# Patient Record
Sex: MALE | Race: BLACK OR AFRICAN AMERICAN | NOT HISPANIC OR LATINO | Employment: STUDENT | URBAN - METROPOLITAN AREA
[De-identification: names, ages, dates, MRNs, and addresses within clinical notes are randomized per-mention and may not be internally consistent; named-entity substitution may affect disease eponyms.]

---
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
## 2022-01-03 ENCOUNTER — TELEPHONE (OUTPATIENT)
Dept: PHARMACY | Facility: CLINIC | Age: 22
End: 2022-01-03
Payer: MEDICAID

## 2022-01-07 ENCOUNTER — TELEPHONE (OUTPATIENT)
Dept: SLEEP MEDICINE | Facility: OTHER | Age: 22
End: 2022-01-07
Payer: MEDICAID

## 2022-01-12 ENCOUNTER — TELEPHONE (OUTPATIENT)
Dept: SLEEP MEDICINE | Facility: OTHER | Age: 22
End: 2022-01-12
Payer: MEDICAID

## 2022-01-17 ENCOUNTER — TELEPHONE (OUTPATIENT)
Dept: PEDIATRIC PULMONOLOGY | Facility: CLINIC | Age: 22
End: 2022-01-17
Payer: MEDICAID

## 2022-01-18 NOTE — TELEPHONE ENCOUNTER
Called patient in regards to sleep study that was ordered. Pt states they have not scheduled but should be calling sometime this week. Provided patient with sleep study number. Pt verbalized understanding.

## 2022-01-31 ENCOUNTER — TELEPHONE (OUTPATIENT)
Dept: SLEEP MEDICINE | Facility: OTHER | Age: 22
End: 2022-01-31
Payer: MEDICAID

## 2022-01-31 NOTE — TELEPHONE ENCOUNTER
Patients parent canceled his sleep study,we talked to parent..  Left a message and sent out a message through [employer] to reschedule.  No response.

## 2022-02-07 ENCOUNTER — TELEPHONE (OUTPATIENT)
Dept: PEDIATRIC PULMONOLOGY | Facility: CLINIC | Age: 22
End: 2022-02-07
Payer: MEDICAID

## 2022-05-16 ENCOUNTER — PATIENT MESSAGE (OUTPATIENT)
Dept: PEDIATRIC PULMONOLOGY | Facility: CLINIC | Age: 22
End: 2022-05-16
Payer: MEDICAID

## 2022-05-16 DIAGNOSIS — R06.7 SNEEZING: ICD-10-CM

## 2022-05-16 DIAGNOSIS — J45.909 ASTHMA, WELL CONTROLLED, UNSPECIFIED ASTHMA SEVERITY, UNSPECIFIED WHETHER PERSISTENT: Primary | ICD-10-CM

## 2022-05-16 DIAGNOSIS — R05.9 COUGH: ICD-10-CM

## 2022-05-16 DIAGNOSIS — R09.82 POST-NASAL DRIP: ICD-10-CM

## 2022-05-16 RX ORDER — ALBUTEROL SULFATE 90 UG/1
4 AEROSOL, METERED RESPIRATORY (INHALATION) EVERY 4 HOURS PRN
Qty: 18 G | Refills: 2 | Status: SHIPPED | OUTPATIENT
Start: 2022-05-16

## 2022-05-16 RX ORDER — MONTELUKAST SODIUM 10 MG/1
10 TABLET ORAL NIGHTLY
Qty: 30 TABLET | Refills: 2 | OUTPATIENT
Start: 2022-05-16 | End: 2023-05-16

## 2022-05-16 RX ORDER — CETIRIZINE HYDROCHLORIDE 10 MG/1
10 TABLET ORAL DAILY
Qty: 30 TABLET | Refills: 2 | OUTPATIENT
Start: 2022-05-16 | End: 2023-05-16

## 2022-05-16 RX ORDER — MOMETASONE FUROATE AND FORMOTEROL FUMARATE DIHYDRATE 50; 5 UG/1; UG/1
AEROSOL RESPIRATORY (INHALATION)
Qty: 13 G | Refills: 2 | Status: SHIPPED | OUTPATIENT
Start: 2022-05-16 | End: 2022-12-22

## 2022-05-17 NOTE — TELEPHONE ENCOUNTER
I refilled the Dulera and albuterol.  He needs an appointment scheduled or transition to an adult provider.  They haven't been to see me since October and they did not do the sleep study I ordered despite multiple reminders.    Request they get Zyrtec and Singulair through PCP if still needed as they were the original prescriber.

## 2022-09-14 NOTE — PROGRESS NOTES
Discharge Planning    Returned call from Lizz Bueno, son, to discuss discharge plan. After a lengthy conversation, the son wants information sent out to [facility] but may choose  home health. He will speak with mother this evening for the final decision and will call writer in the morning. The family will transport patient to and from radiation treatments. Answered all questions and addressed concerns for discharge. [place] of choice for facilities in [facility] and placed in cc/link     will continue to assist with transition of care needs.      MARCY MarchN, RN  Pager # [phone]  Care Manager Subjective:      Jacque Naik is a 17 y.o. male here with mother. Patient brought in for sore throat,diarrhea, headache, and congestion    History of Present Illness:  HPI  He had sore throat noted 3 days , but not since then.  He had lessened voice.  No fever  He had 3-4 loose stools two days ago;  He had 2 loose stools yesterday, x 1 today  No vomiting.    He coughed up mucous today;  Dark red blood noted.  No pain except for abdominal pain.     He had headache earlier today     Review of Systems   Constitutional: Negative.  Negative for activity change.   HENT: Positive for congestion and sore throat. Negative for ear pain.    Eyes: Negative for discharge.   Respiratory: Negative for cough.    Gastrointestinal: Positive for abdominal pain and diarrhea. Negative for vomiting.   Genitourinary: Negative for dysuria.   Skin: Negative for rash.   Neurological: Positive for headaches.       Objective:     Physical Exam   Constitutional:   Obese with dark skin around the neck   HENT:   Head: Normocephalic.   Right Ear: External ear normal.   Left Ear: External ear normal.   Eyes: Right eye exhibits no discharge. Left eye exhibits no discharge.   Neck: Neck supple.   Cardiovascular: Normal rate and normal heart sounds.    No murmur heard.  Pulmonary/Chest: Effort normal. No respiratory distress. He has no wheezes. He has no rales.   Abdominal: He exhibits no distension and no mass. There is no tenderness. There is no rebound and no guarding.   Genitourinary: Penis normal.   Neurological: He is alert.   Skin: Skin is warm. He is not diaphoretic.   Psychiatric: He has a normal mood and affect.       Assessment:         Jacque was seen today for sore throat, nasal congestion and diarrhea.    Diagnoses and all orders for this visit:    Diarrhea, unspecified type    Sore throat    Acanthosis nigricans    ELVA (obstructive sleep apnea)    Obesity with body mass index (BMI) greater than 99th percentile for age in pediatric  patient, unspecified obesity type, unspecified whether serious comorbidity present              Plan:         Patient Instructions   Please drink only gatorade/powerade and eat bananas until 5 PM;  Then a regular dinner;  Baked chicken, dry potato and carrots.    If the diarrhea is not lessening, please return with a stool sample.       Please plan on your surgery for his sleep apnea.      Cool mist humidifier for 3-4 days    Elevate Head of Bed    Measure temperature 3 times daily

## 2022-10-26 ENCOUNTER — HOSPITAL ENCOUNTER (EMERGENCY)
Facility: HOSPITAL | Age: 22
Discharge: HOME | End: 2022-10-27
Attending: EMERGENCY MEDICINE
Payer: MEDICAID

## 2022-10-26 DIAGNOSIS — J06.9 VIRAL URI WITH COUGH: Primary | ICD-10-CM

## 2022-10-26 PROCEDURE — 99283 EMERGENCY DEPT VISIT LOW MDM: CPT | Mod: 25,ER

## 2022-10-26 NOTE — Clinical Note
"Jacque Minor" Gumaro was seen and treated in our emergency department on 10/26/2022.  He may return to work on 10/29/2022.       If you have any questions or concerns, please don't hesitate to call.      Tarsha RAM    "

## 2022-10-27 VITALS
HEART RATE: 101 BPM | RESPIRATION RATE: 18 BRPM | WEIGHT: 286 LBS | HEIGHT: 72 IN | DIASTOLIC BLOOD PRESSURE: 86 MMHG | OXYGEN SATURATION: 99 % | BODY MASS INDEX: 38.74 KG/M2 | TEMPERATURE: 99 F | SYSTOLIC BLOOD PRESSURE: 150 MMHG

## 2022-10-27 LAB
INFLUENZA A, MOLECULAR: NEGATIVE
INFLUENZA B, MOLECULAR: NEGATIVE
SPECIMEN SOURCE: NORMAL

## 2022-10-27 PROCEDURE — 25000003 PHARM REV CODE 250: Mod: ER | Performed by: EMERGENCY MEDICINE

## 2022-10-27 PROCEDURE — 87502 INFLUENZA DNA AMP PROBE: CPT | Mod: ER | Performed by: EMERGENCY MEDICINE

## 2022-10-27 RX ORDER — FLUTICASONE PROPIONATE 50 MCG
1 SPRAY, SUSPENSION (ML) NASAL 2 TIMES DAILY PRN
Qty: 15 G | Refills: 0 | Status: SHIPPED | OUTPATIENT
Start: 2022-10-27 | End: 2022-12-22

## 2022-10-27 RX ORDER — IBUPROFEN 400 MG/1
800 TABLET ORAL
Status: COMPLETED | OUTPATIENT
Start: 2022-10-27 | End: 2022-10-27

## 2022-10-27 RX ORDER — BENZONATATE 100 MG/1
200 CAPSULE ORAL 3 TIMES DAILY PRN
Qty: 20 CAPSULE | Refills: 0 | Status: SHIPPED | OUTPATIENT
Start: 2022-10-27 | End: 2022-11-06

## 2022-10-27 RX ADMIN — IBUPROFEN 800 MG: 400 TABLET, FILM COATED ORAL at 12:10

## 2022-11-04 NOTE — ED PROVIDER NOTES
"Encounter Date: 10/26/2022       History     Chief Complaint   Patient presents with    multiple complaints     Reports having headache, sore throat, left ear pain, hypertension, and chills. States he was seen at urgent care yesterday and tested for strep which was negative. Patient currently taking amoxicillin.     Patient currently presents with concerns regarding flulike symptoms.  Symptoms include nasal congestion, headache, sore throat, fever, and myalgias.  Symptoms reportedly began 2 days ago.  Patient denies vomiting. Patient denies diarrhea.  There has been no relief with over-the-counter medications at home.  Patient denies recent ill contacts.  Notably the patient was seen at  yesterday and tested negative for strep.      Review of patient's allergies indicates:  No Known Allergies  Past Medical History:   Diagnosis Date    Acanthosis nigricans     Asthma, not well controlled     Asthma, well controlled     Cough     Eczema     Obesity     Patient non adherence     Sleep apnea      Past Surgical History:   Procedure Laterality Date    MYRINGOTOMY W/ TUBES      TONSILLECTOMY AND ADENOIDECTOMY Bilateral 5/29/2018    Procedure: TONSILLECTOMY-ADENOIDECTOMY (T AND A);  Surgeon: Isaac Byrd MD;  Location: [facility] OR [address] [place] [address];  Service: ENT;  Laterality: Bilateral;     Family History   Problem Relation Age of Onset    Diabetes Mother     Congenital heart disease Mother 0        "innocent murmur"    Diabetes Father     Pacemaker/defibrilator Maternal Grandmother 70        bradycardia    Amblyopia Neg Hx     Blindness Neg Hx     Other Neg Hx     Arrhythmia Neg Hx     Cardiomyopathy Neg Hx     Early death Neg Hx     Heart attacks under age 50 Neg Hx      Social History     Tobacco Use    Smoking status: Never    Smokeless tobacco: Never   Substance Use Topics    Alcohol use: No    Drug use: No     Review of Systems   Constitutional:  Positive for chills and fatigue. Negative for fever.   HENT:  Positive for " congestion, ear pain and sore throat.    Respiratory:  Negative for chest tightness and shortness of breath.    Cardiovascular:  Negative for chest pain and palpitations.   Gastrointestinal:  Negative for abdominal pain and vomiting.   Genitourinary:  Negative for difficulty urinating and dysuria.   Skin:  Negative for color change and rash.   Neurological:  Positive for headaches. Negative for weakness and numbness.   Hematological:  Negative for adenopathy.   All other systems reviewed and are negative.    Physical Exam     Initial Vitals [10/26/22 2354]   BP Pulse Resp Temp SpO2   (!) 159/91 94 20 99.3 °F (37.4 °C) 100 %      MAP       --         Physical Exam    Constitutional: He appears well-developed and well-nourished. He is not diaphoretic. No distress.   HENT:   Head: Normocephalic and atraumatic.   Nose: Nose normal.   Mouth/Throat: Oropharynx is clear and moist.   Eyes: Conjunctivae are normal. No scleral icterus.   Neck: Neck supple. No JVD present.   Cardiovascular:  Normal rate, regular rhythm, normal heart sounds and intact distal pulses.           Pulmonary/Chest: Breath sounds normal. No respiratory distress.   Abdominal: Abdomen is soft. He exhibits no distension. There is no abdominal tenderness.   Musculoskeletal:      Cervical back: Neck supple.     Neurological: He is alert and oriented to person, place, and time.   Skin: Skin is warm and dry.       ED Course   Procedures  Labs Reviewed   INFLUENZA A & B BY MOLECULAR          Imaging Results    None          Medications   ibuprofen tablet 800 mg (800 mg Oral Given 10/27/22 0037)     Medical Decision Making:   Clinical Tests:   Lab Tests: Ordered and Reviewed  ED Management:  All findings were reviewed with the patient/family in detail along with the diagnosis of URI.  Patient/family has been advised to use an appropriate antihistamine and expectorant/antitussive agents for symptomatic relief pending resolution and PCP follow-up.  I see no  indication of an emergent process beyond that addressed during our encounter but have duly counseled the patient/family regarding the need for prompt follow-up as well as the indications that should prompt immediate return to the emergency room should new or worrisome developments occur.  The patient/family communicates understanding of all this information and all remaining questions and concerns were addressed at this time.                            Clinical Impression:   Final diagnoses:  [J06.9] Viral URI with cough (Primary)        ED Disposition Condition    Discharge Stable          ED Prescriptions       Medication Sig Dispense Start Date End Date Auth. Provider    benzonatate (TESSALON) 100 MG capsule Take 2 capsules (200 mg total) by mouth 3 (three) times daily as needed for Cough. 20 capsule 10/27/2022 11/6/2022 Royce Fleming MD    fluticasone propionate (FLONASE) 50 mcg/actuation nasal spray 1 spray (50 mcg total) by Each Nostril route 2 (two) times daily as needed for Rhinitis. 15 g 10/27/2022 -- Royce Fleming MD          Follow-up Information       Follow up With Specialties Details Why Contact Info    Edward Barton MD Pediatrics Schedule an appointment as soon as possible for a visit  for reassessment [address] [facility] [address]  [phone]      [facility] - Emergency Dept Emergency Medicine Go to  As needed, If symptoms worsen [address] [facility] [address] [phone]             Royce Fleming MD  11/04/22 0549

## 2022-12-22 ENCOUNTER — PATIENT MESSAGE (OUTPATIENT)
Dept: PEDIATRIC PULMONOLOGY | Facility: CLINIC | Age: 22
End: 2022-12-22

## 2022-12-22 ENCOUNTER — OFFICE VISIT (OUTPATIENT)
Dept: PEDIATRIC PULMONOLOGY | Facility: CLINIC | Age: 22
End: 2022-12-22
Payer: MEDICAID

## 2022-12-22 VITALS
HEIGHT: 72 IN | OXYGEN SATURATION: 99 % | WEIGHT: 279.13 LBS | RESPIRATION RATE: 18 BRPM | BODY MASS INDEX: 37.81 KG/M2 | HEART RATE: 85 BPM

## 2022-12-22 DIAGNOSIS — G47.33 OSA (OBSTRUCTIVE SLEEP APNEA): ICD-10-CM

## 2022-12-22 DIAGNOSIS — R05.8 OTHER COUGH: Primary | ICD-10-CM

## 2022-12-22 LAB
FEF 25 75 LLN: 2.76
FEF 25 75 PRE REF: 117.6 %
FEF 25 75 REF: 4.62
FEV05 LLN: 1.86
FEV05 REF: 3.3
FEV1 FVC LLN: 75
FEV1 FVC PRE REF: 99.7 %
FEV1 FVC REF: 86
FEV1 LLN: 3.34
FEV1 PRE REF: 115.9 %
FEV1 REF: 4.24
FVC LLN: 3.96
FVC PRE REF: 115.1 %
FVC REF: 4.98
PEF LLN: 7.75
PEF PRE REF: 95.5 %
PEF REF: 10.56
PHYSICIAN COMMENT: NORMAL
PRE FEF 25 75: 5.43 L/S (ref 2.76–6.95)
PRE FET 100: 6.42 SEC
PRE FEV05 REF: 113 %
PRE FEV1 FVC: 85.68 % (ref 74.53–95.35)
PRE FEV1: 4.91 L (ref 3.34–5.1)
PRE FEV5: 3.72 L (ref 1.86–4.73)
PRE FVC: 5.73 L (ref 3.96–6.01)
PRE PEF: 10.09 L/S (ref 7.75–13.37)

## 2022-12-22 PROCEDURE — 99212 OFFICE O/P EST SF 10 MIN: CPT | Mod: S$PBB,25,, | Performed by: PEDIATRICS

## 2022-12-22 PROCEDURE — 99999 PR PBB SHADOW E&M-EST. PATIENT-LVL IV: ICD-10-PCS | Mod: PBBFAC,,, | Performed by: PEDIATRICS

## 2022-12-22 PROCEDURE — 1159F MED LIST DOCD IN RCRD: CPT | Mod: CPTII,,, | Performed by: PEDIATRICS

## 2022-12-22 PROCEDURE — 4010F ACE/ARB THERAPY RXD/TAKEN: CPT | Mod: CPTII,,, | Performed by: PEDIATRICS

## 2022-12-22 PROCEDURE — 99212 PR OFFICE/OUTPT VISIT, EST, LEVL II, 10-19 MIN: ICD-10-PCS | Mod: S$PBB,25,, | Performed by: PEDIATRICS

## 2022-12-22 PROCEDURE — 94010 BREATHING CAPACITY TEST: CPT | Mod: 26,S$PBB,, | Performed by: PEDIATRICS

## 2022-12-22 PROCEDURE — 99999 PR PBB SHADOW E&M-EST. PATIENT-LVL IV: CPT | Mod: PBBFAC,,, | Performed by: PEDIATRICS

## 2022-12-22 PROCEDURE — 94010 BREATHING CAPACITY TEST: ICD-10-PCS | Mod: 26,S$PBB,, | Performed by: PEDIATRICS

## 2022-12-22 PROCEDURE — 3008F BODY MASS INDEX DOCD: CPT | Mod: CPTII,,, | Performed by: PEDIATRICS

## 2022-12-22 PROCEDURE — 99214 OFFICE O/P EST MOD 30 MIN: CPT | Mod: PBBFAC | Performed by: PEDIATRICS

## 2022-12-22 PROCEDURE — 1159F PR MEDICATION LIST DOCUMENTED IN MEDICAL RECORD: ICD-10-PCS | Mod: CPTII,,, | Performed by: PEDIATRICS

## 2022-12-22 PROCEDURE — 3008F PR BODY MASS INDEX (BMI) DOCUMENTED: ICD-10-PCS | Mod: CPTII,,, | Performed by: PEDIATRICS

## 2022-12-22 PROCEDURE — 90686 IIV4 VACC NO PRSV 0.5 ML IM: CPT | Mod: PBBFAC

## 2022-12-22 PROCEDURE — 94010 BREATHING CAPACITY TEST: CPT | Mod: PBBFAC | Performed by: PEDIATRICS

## 2022-12-22 PROCEDURE — 4010F PR ACE/ARB THEARPY RXD/TAKEN: ICD-10-PCS | Mod: CPTII,,, | Performed by: PEDIATRICS

## 2022-12-22 NOTE — PATIENT INSTRUCTIONS
Flu shot today.    Stop Dulera.      Albuterol 4 puffs every 4 hours for persistent coughing, wheezing, and/or labored breathing.      Please keep a log of albuterol use.  Please bring the log to the follow-up visit.    Date Time Symptoms Effective?   Y/N                                                                                   Call if any of the below are happening:    Cough, wheeze, or shortness of breath more than 2 days per week  Nighttime awakenings due to cough, wheeze or short of breath more than 2 times per month  Rescue medication is used more than 2 days per week (does not include taking it before activity to prevent exercise-induced bronchospasm)  Activity limitation due to cough, wheeze, or shortness of breath       Split night sleep study.    Can apply Aquaphor to dry skin twice daily as needed.     Okay to stop Zyrtec, Singulair, and Flonase.  Will follow symptoms.

## 2022-12-22 NOTE — PROGRESS NOTES
"Subjective:       Patient ID: Jacque Naik is a 22 y.o. male.    Chief Complaint: Follow-up    The last visit with me in clinic was 10/12/21.  Nebulized albuterol twice per day for a week with cough, when he had Covid-19.  It helped.  Before that was "a long time".  Reports taking Dulera 2 puffs twice per day.  Dulera 50/5, last order was in May 2022 with 2 refills.  No interval sleep study.  Last May 2019.  Uses CPAP some nights.  Mom asked about eczema cream.  Still need Zyrtec and Singulair?    Review of Systems  12 point ROS negative.        Objective:      Spirometry was performed today.  There is not scooping noted in the expiratory limb of the flow volume loop to suggest small airway obstruction.  The FVC is 115 % predicted.  The FEV1 is 116 % predicted.  The FEV1 to FVC ratio is 86 %.  FEF 2575 is 118 % predicted.  Testing is normal.      Physical Exam  Constitutional:       Appearance: He is not ill-appearing.      Comments: Pulse 85, resp. rate 18, height 6' 0.05" (1.83 m), weight 126.6 kg (279 lb 1.6 oz), SpO2 99 %.   Pulmonary:      Effort: No respiratory distress.      Breath sounds: No wheezing.      Comments: Not coughing.  BS not decreased.  Neurological:      Mental Status: He is alert.       Assessment:       1. Other cough    2. ELVA (obstructive sleep apnea)          Plan:       Flu shot today.    Stop Dulera.      Albuterol 4 puffs every 4 hours as needed for persistent coughing, wheezing, and/or labored breathing.      Please keep a log of albuterol use.  Please bring the log to the follow-up visit.    Date Time Symptoms Effective?   Y/N                                                                                   Call if any of the below are happening:    Cough, wheeze, or shortness of breath more than 2 days per week  Nighttime awakenings due to cough, wheeze or short of breath more than 2 times per month  Rescue medication is used more than 2 days per week (does not include taking it " before activity to prevent exercise-induced bronchospasm)  Activity limitation due to cough, wheeze, or shortness of breath       Split night sleep study.    Can apply Aquaphor to dry skin twice daily as needed.     Okay to stop Zyrtec, Singulair, and Flonase.  Will follow symptoms.

## 2022-12-30 ENCOUNTER — TELEPHONE (OUTPATIENT)
Dept: SLEEP MEDICINE | Facility: OTHER | Age: 22
End: 2022-12-30
Payer: MEDICAID

## 2023-01-09 ENCOUNTER — TELEPHONE (OUTPATIENT)
Dept: SLEEP MEDICINE | Facility: OTHER | Age: 23
End: 2023-01-09
Payer: MEDICAID

## 2023-01-13 ENCOUNTER — PATIENT MESSAGE (OUTPATIENT)
Dept: PEDIATRIC PULMONOLOGY | Facility: CLINIC | Age: 23
End: 2023-01-13
Payer: MEDICAID

## 2023-01-19 ENCOUNTER — TELEPHONE (OUTPATIENT)
Dept: PEDIATRIC PULMONOLOGY | Facility: CLINIC | Age: 23
End: 2023-01-19
Payer: MEDICAID

## 2023-01-19 NOTE — TELEPHONE ENCOUNTER
Called and spoke to Jacque per provider's request below. Jacque was currently driving but states he will check the message and give the sleep lab a call. Verbalized an understanding.     MD RAQUEL Alexander Staff  Please reach out to parent regarding unread "[employer]hart message.     TH        ----- Message -----   From: Wesley Valderrama MD   Sent: 1/13/2023   To: NaikJacque cooney   Subject: Unread Message Notification                       Please call the [facility] at [phone], to schedule your sleep study. Thank you!

## 2023-06-19 ENCOUNTER — TELEPHONE (OUTPATIENT)
Dept: SLEEP MEDICINE | Facility: OTHER | Age: 23
End: 2023-06-19
Payer: MEDICAID

## 2023-07-07 ENCOUNTER — HOSPITAL ENCOUNTER (OUTPATIENT)
Dept: SLEEP MEDICINE | Facility: OTHER | Age: 23
Discharge: HOME | End: 2023-07-07
Attending: PEDIATRICS
Payer: MEDICAID

## 2023-07-07 ENCOUNTER — TELEPHONE (OUTPATIENT)
Dept: SLEEP MEDICINE | Facility: OTHER | Age: 23
End: 2023-07-07
Payer: MEDICAID

## 2023-07-07 DIAGNOSIS — G47.33 OSA (OBSTRUCTIVE SLEEP APNEA): ICD-10-CM

## 2023-07-07 PROCEDURE — 95810 POLYSOM 6/> YRS 4/> PARAM: CPT

## 2023-07-07 NOTE — Clinical Note
Keenan Szymanski,  Please see attached report. The lab used adult study protocol, so no ETCO2. Patient had optimal supine sleep with low sleep efficiency and low REM sleep. I don't consider repeating a sleep study will change the Ahi significantly to 5 which is the adult criteria for mild ELVA.  Leyden

## 2023-07-08 NOTE — PROGRESS NOTES
Baseline PSG was performed on [facility]. The whole procedure was explained and instructions were given on how to call out for assistance. The thank you letter was given to the patient.

## 2023-07-13 PROCEDURE — 95810 POLYSOM 6/> YRS 4/> PARAM: CPT | Mod: 26,,, | Performed by: GENERAL ACUTE CARE HOSPITAL

## 2023-07-13 PROCEDURE — 95810 PR POLYSOMNOGRAPHY, 4 OR MORE: ICD-10-PCS | Mod: 26,,, | Performed by: GENERAL ACUTE CARE HOSPITAL

## 2023-07-13 NOTE — PROCEDURES
[facility]    Polysomnography Interpretation Report    Patient Name:  Jacque Naik  MRN#:  [id number]  :  2000  Study Date:  2023  Referring Provider:  MARKELL Valderrama MD    Indications for Polysomnography:  The patient is a 22 year old Male who is 6' and weighs 279.0 lbs.  His BMI equals 38.2.  A full night polysomnogram was performed to evaluate for Sleep disordered breathing. PSG on 22  minutes, SE 88%, 16.6 % REM, AHI 16, GRANT 0.3, oAHI 15.7, supine AHI 16, REM AHI 6.3. O2 lucrecia 91%, ETCO2 data not available. CPAP titration on 2019 recommended CPAP 5 cmH20. Patient on CPAP at home.  The patient presented with a history of residual snoring and poor sleep quality. Prior interventions include: Adenotonsillectomy 2018.  Past medical history: Obesity, Asthma, Metabolic syndrome Medications: Albuterol, Allopurinol, Ergocalciferol, Inulin, Losartan, Multivitamin, Glycolax.    Polysomnogram Data  A full night polysomnogram recorded the standard physiologic parameters including EEG, EOG, EMG, EKG, nasal and oral airflow.  Respiratory parameters of chest and abdominal movements were recorded with [place]-Crystal motion transducers.  Oxygen saturation was recorded by pulse oximetry.    Sleep Architecture  The total recording time of the polysomnogram was 380.3 minutes.  The total sleep time was 320.0 minutes.  The patient spent 13.1% of total sleep time in Stage N1, 59.8% in Stage N2, 13.0% in Stages N3, and 14.1% in REM.  Sleep latency was 5.8 minutes.  REM latency was 165.0 minutes.  Sleep Efficiency was 84.1%.  Wake after sleep onset was 54.5 minutes.    Respiratory Events  The polysomnogram revealed a presence of 0 obstructive, 0 central, and 0 mixed apneas resulting in a Total Apnea index of 0 events per hour.  There were 5 hypopneas resulting in a Total Hypopnea index of 0.9 events per hour.  The combined Apnea/Hypopnea index was 0.9 events per hour.  There were a total  of 0 RERA events resulting in a Respiratory Disturbance Index (RDI) of 0.9 events per hour.     Mean oxygen saturation was 95.4%.  The lowest oxygen saturation during sleep was 89.0%.  Time spent ?88% oxygen saturation was 0 minutes.    Limb Activity  There were 36 limb movements recorded.  Of this total, 36 were classified as PLMs.  Of the PLMs, 16 were associated with arousals.  The Limb Movement index was 6.8 per hour while the PLM index was 6.8 per hour and PLM with arousals index was 3.0 per hour.    Cardiac Summary  The average pulse rate was 63.4 bpm.  The minimum pulse rate was 51.0 bpm while the maximum pulse rate was 101.0 bpm.    Diagnosis:     This study does not demonstrate obstructive sleep apnea. Primary snoring/mild sleep related breathing disruption (R06. 83) was noted. The obstructive apnea-hypopnea index (AHI) was 0.9 (within normal limits).   No significant hypoxemia was noted.   Good sleep architecture for age with arousals/awakenings likely related to technical interventions and study related disruptions (first night effect).    Recommendations:  Discontinue CPAP therapy.    Follow up with referring provider.    Thank you for referring this patient to the [facility].    I have reviewed the attached data report and the raw data tracings of this study epoch-by-epoch and have determined that the recording quality and scoring of events are sufficient to allow for interpretation and electronically signed by:      Leyden Lozada, MD  Pediatric Pulmonology and Sleep Medicine  [facility]  Office: [phone]  Fax: [phone]      [facility]    Diagnostic PSG Report    Patient Name: Jacque Naik Study Date: 7/7/2023   YOB: 2000 MRN #: [id number]   Age: 22 year KAYLENE #: [id number]   Sex: Male Referring Provider: MARKELL Valderrama MD   Height: 6' Recording Tech: Tobin Snyder RPSGT   Weight: 279.0 lbs Scoring Tech: Isaac  Sohan RRT RPSGT   BMI: 38.2 Interpreting Physician: Leyden Lozada-Jimenez, MD   ESS: - Neck Circumference: -     Study Overview    Lights Off: 10:54:04 PM  Count Index   Lights On: 05:14:23 AM Awakenings: 39 7.3   Time in Bed: 380.3 min. Arousals: 102 19.1   Total Sleep Time: 320.0 min. Apneas & Hypopneas: 5 0.9    Sleep Efficiency: 84.1% Limb Movements: 36 6.8   Sleep Latency: 5.8 min. Snores: - -   Wake After Sleep Onset: 54.5 min. Desaturations: 15 2.8    REM Latency from Sleep Onset: 165.0 min. Minimum SpO2 TST: 89.0%        Sleep Architecture   % of Time in Bed  Stages Time (mins) % Sleep Time   Wake 61.0    Stage N1 42.0 13.1%   Stage N2 191.5 59.8%   Stage N3 41.5 13.0%   REM 45.0 14.1%         Arousal Summary     NREM REM Sleep Index   Respiratory Arousals - 1 1 0.2   PLM Arousals 16 - 16 3.0   Isolated Limb Movement Arousals - - - -   Spontaneous Arousals 77 8 85 15.9   Total 93 9 102 19.1       Limb Movement Summary     Count Index   Isolated Limb Movements - -   Periodic Limb Movements (PLMs) 36 6.8   Total Limb Movements 36 6.8         Respiratory Summary     By Sleep Stage By Body Position Total    NREM REM Supine Non-Supine    Time (min) 275.0 45.0 320.0 - 320.0           Obstructive Apnea - - - - -   Mixed Apnea - - - - -   Central Apnea - - - - -   Total Apneas - - - - -   Total Apnea Index - - - - -           Hypopnea - 5 5 - 5   Hypopnea Index - 6.7 0.9 - 0.9           Apnea & Hypopnea - 5 5 - 5   Apnea & Hypopnea Index - 6.7 0.9 - 0.9           RERAs - - - - -   RERA Index - - - - -           RDI - 6.7 0.9 - 0.9     Scoring Criteria: Hypopneas scored at Choose an item.% desaturation criteria.    Respiratory Event Durations     Apnea Hypopnea    NREM REM NREM REM   Average (seconds) - - - 13.1   Maximum (seconds) - - - 14.4       Oxygen Saturation Summary     Wake NREM REM TST TIB   Average SpO2 96.3% 95.2% 95.4% 95.3% 95.4%   Minimum SpO2 93.0% 89.0% 91.0% 89.0% 89.0%   Maximum SpO2 99.0% 98.0%  98.0% 98.0% 99.0%       Oxygen Saturation Distribution    Range (%) Time in range (min) Time in range (%)   90.0 - 100.0 376.3 100.0%   80.0 - 90.0 0.2 0.0%   70.0 - 80.0 - -   60.0 - 70.0 - -   50.0 - 60.0 - -   0.0 - 50.0 - -   Time Spent ?88% SpO2    Range (%) Time in range (min) Time in range (%)   0.0 - 88.0 - -          Count Index   Desaturations 15 2.8      Cardiac Summary     Wake NREM REM Sleep Total   Average Pulse Rate (BPM) 68.5 61.8 67.4 62.5 63.4   Minimum Pulse Rate (BPM) 54.0 51.0 57.0 51.0 51.0   Maximum Pulse Rate (BPM) 101.0 93.0 95.0 95.0 101.0     Pulse Rate Distribution    Range (bpm) Time in range (min) Time in range (%)   0.0 - 40.0 - -   40.0 - 60.0 138.1 36.6%   60.0 - 80.0 231.6 61.4%   80.0 - 100.0 7.6 2.0%   100.0 - 120.0 0.0 0.0%   120.0 - 140.0 - -   140.0 - 200.0 - -     EtCO2 Summary    Stage Min (mmHg) Average (mmHg) Max (mmHg)   Wake - - -   NREM(1+2+3) - - -   REM - - -     Range (mmHg) Time in range (min) Time in range (%)   - - -   - - -   - - -   - - -   - - -     TcCO2 Summary    Stage Min (mmHg) Average (mmHg) Max (mmHg)   Wake - - -   NREM(1+2+3) - - -   REM - - -     Range (mmHg) Time in range (min) Time in range (%)   20.0 - 40.0 - -   40.0 - 50.0 - -   50.0 - 55.0 - -   55.0 - 100.0 - -   Excluded data <20.0 & >65.0 381.0 100.0%     Comments    -

## 2023-07-14 ENCOUNTER — PATIENT MESSAGE (OUTPATIENT)
Dept: PEDIATRIC PULMONOLOGY | Facility: CLINIC | Age: 23
End: 2023-07-14
Payer: MEDICAID

## 2023-07-14 DIAGNOSIS — R06.83 SNORING: Primary | ICD-10-CM

## 2023-07-17 ENCOUNTER — TELEPHONE (OUTPATIENT)
Dept: PEDIATRIC PULMONOLOGY | Facility: CLINIC | Age: 23
End: 2023-07-17
Payer: MEDICAID

## 2023-07-17 NOTE — TELEPHONE ENCOUNTER
----- Message from Kathya Hurley sent at 7/17/2023 11:10 AM CDT -----  Contact: Adela--[facility]-- [phone] ext [phone]  [email or web address]     Patient is calling for Medical Advice regarding:    Pt is not in the system at [facility]     Would like response via [employer]:  call back     Comments:   Jennifer states that she is not sure what [employer] is in.

## 2023-08-01 ENCOUNTER — PATIENT MESSAGE (OUTPATIENT)
Dept: PEDIATRIC PULMONOLOGY | Facility: CLINIC | Age: 23
End: 2023-08-01
Payer: MEDICAID

## 2023-08-24 ENCOUNTER — HOSPITAL ENCOUNTER (OUTPATIENT)
Dept: RADIOLOGY | Facility: HOSPITAL | Age: 23
Discharge: HOME | End: 2023-08-24
Attending: CHIROPRACTOR
Payer: MEDICAID

## 2023-08-24 DIAGNOSIS — M54.42 ACUTE BACK PAIN WITH SCIATICA, LEFT: Primary | ICD-10-CM

## 2023-08-24 DIAGNOSIS — M54.42 ACUTE BACK PAIN WITH SCIATICA, LEFT: ICD-10-CM

## 2023-08-24 PROCEDURE — 72110 X-RAY EXAM L-2 SPINE 4/>VWS: CPT | Mod: TC,FY,PO

## 2023-08-24 PROCEDURE — 72110 X-RAY EXAM L-2 SPINE 4/>VWS: CPT | Mod: 26,,, | Performed by: RADIOLOGY

## 2023-08-24 PROCEDURE — 72110 XR LUMBAR SPINE AP AND LAT WITH FLEX/EXT: ICD-10-PCS | Mod: 26,,, | Performed by: RADIOLOGY

## 2024-09-12 NOTE — PLAN OF CARE
06/05/18 1603   Discharge Assessment   Assessment Type Discharge Planning Assessment   Attempted, pt discharging home.  
   06/05/18 1603   Final Note   Assessment Type Final Discharge Note   Discharge Disposition Home     
Problem: Patient Care Overview  Goal: Plan of Care Review  Outcome: Ongoing (interventions implemented as appropriate)  Reviewed plan of care with pt and parents. All verbalized understanding and concerns addressed. Pt vss, afebrile, no distress noted. Pt c/o pain 5/10 to throat. Taking some po. Some relief noted with hycet. IV fluids started and maintained. Pt has not yet voided since arrived on peds floor. Will continue to monitor.      
Problem: Patient Care Overview  Goal: Plan of Care Review  Outcome: Ongoing (interventions implemented as appropriate)  Tolerates soft diet. Urinated x1; no bowel movement today. Telemetry and pulse oximeter; no alarms today. Saline locked at 3pm. Instructed patient to ambulate and if feels comfortable will get discharged. Mom and patient verbalized understanding of POC.      
[FreeTextEntry1] : 15 year old seen last week with impetiginous lesion on leg; prescribed mupirocin that area has crusted over and is improving ; however, pt now has lesions on face, ears worsening no fever admits to picking/touching areas will start oral antibiotics  continue mupiricin discussed importance of NOT touching area as this increases risk of infection spreading wound culture sent out f/u Sat to r/c area and go over cx results

## 2024-10-26 ENCOUNTER — NURSE TRIAGE (OUTPATIENT)
Dept: ADMINISTRATIVE | Facility: CLINIC | Age: 24
End: 2024-10-26
Payer: MEDICAID

## 2024-10-26 NOTE — TELEPHONE ENCOUNTER
Mom says yesterday morning he had the chills and said his body was aching. Tonight his temp 102.9 and he was given 2 motrins. He is also c/o headache, 5/10. Care advice discussed, understanding verbalized. Non-[facility].     Reason for Disposition   [1] Fever AND [2] no signs of serious infection or localizing symptoms (all other triage questions negative)    Additional Information   Negative: Shock suspected (e.g., cold/pale/clammy skin, too weak to stand, low BP, rapid pulse)   Negative: Difficult to awaken or acting confused (e.g., disoriented, slurred speech)   Negative: [1] Difficulty breathing AND [2] bluish lips, tongue or face   Negative: New-onset rash with multiple purple (or blood-colored) spots or dots   Negative: Sounds like a life-threatening emergency to the triager   Negative: [1] Headache AND [2] stiff neck (can't touch chin to chest)   Negative: Difficulty breathing   Negative: IV Drug Use (IVDU)   Negative: [1] Drinking very little AND [2] dehydration suspected (e.g., no urine > 12 hours, very dry mouth, very lightheaded)   Negative: Widespread rash and cause unknown   Negative: Patient sounds very sick or weak to the triager  (Exception: Mild weakness AND hasn't taken fever medicine.)   Negative: Fever > 104 F (40 C)   Negative: [1] Fever > 101 F (38.3 C) AND [2] age > 60 years   Negative: [1] Fever > 100 F (37.8 C) AND [2] bedridden (e.g., CVA, chronic illness, recovering from surgery)   Negative: [1] Fever > 100 F (37.8 C) AND [2] indwelling urinary catheter (e.g., Lindsay, Cou[place])   Negative: [1] Fever > 100 F (37.8 C) AND [2] has port (portacath), central line, or PICC line   Negative: [1] Fever > 100 F (37.8 C) AND [2] diabetes mellitus or weak immune system (e.g., HIV positive, cancer chemo, splenectomy, organ transplant, chronic steroids)   Negative: [1] Fever > 100 F (37.8 C) AND [2] surgery in the last month   Negative: Transplant patient (e.g., kidney, liver, lung, heart)   Negative:  Severe chills (i.e., feeling extremely cold WITH shaking chills)   Negative: Fever present > 3 days (72 hours)   Negative: [1] Fever > 100 F (37.8 C) AND [2] foreign travel to a developing country in the last month   Negative: [1] Fever comes and goes (intermittent) AND [2] lasts > 3 weeks    Protocols used: Fever-A-AH

## 2024-10-29 ENCOUNTER — OFFICE VISIT (OUTPATIENT)
Dept: URGENT CARE | Facility: CLINIC | Age: 24
End: 2024-10-29
Payer: COMMERCIAL

## 2024-10-29 VITALS
SYSTOLIC BLOOD PRESSURE: 140 MMHG | HEART RATE: 77 BPM | DIASTOLIC BLOOD PRESSURE: 92 MMHG | TEMPERATURE: 99 F | BODY MASS INDEX: 37.81 KG/M2 | OXYGEN SATURATION: 99 % | RESPIRATION RATE: 20 BRPM | HEIGHT: 72 IN | WEIGHT: 279.13 LBS

## 2024-10-29 DIAGNOSIS — J32.9 VIRAL SINUSITIS: ICD-10-CM

## 2024-10-29 DIAGNOSIS — B97.89 VIRAL SINUSITIS: ICD-10-CM

## 2024-10-29 DIAGNOSIS — R50.9 FEVER, UNSPECIFIED FEVER CAUSE: Primary | ICD-10-CM

## 2024-10-29 LAB
CTP QC/QA: YES
SARS-COV-2 AG RESP QL IA.RAPID: NEGATIVE

## 2024-10-29 RX ORDER — OSELTAMIVIR PHOSPHATE 75 MG/1
75 CAPSULE ORAL 2 TIMES DAILY
COMMUNITY
Start: 2024-10-26

## (undated) DEVICE — BLADE RED 40 ADENOID

## (undated) DEVICE — SEE MEDLINE ITEM 157117

## (undated) DEVICE — PACK TONSIL CUSTOM

## (undated) DEVICE — SEE MEDLINE ITEM 152496

## (undated) DEVICE — SUCTION COAGULATOR 10FR 6IN

## (undated) DEVICE — CATH ALL PUR URTHL RR 10FR

## (undated) DEVICE — PENCIL ROCKER SWITCH 10FT CORD

## (undated) DEVICE — ELECTRODE REM PLYHSV RETURN 9